# Patient Record
Sex: FEMALE | Race: WHITE | NOT HISPANIC OR LATINO | Employment: FULL TIME | URBAN - METROPOLITAN AREA
[De-identification: names, ages, dates, MRNs, and addresses within clinical notes are randomized per-mention and may not be internally consistent; named-entity substitution may affect disease eponyms.]

---

## 2022-05-25 ENCOUNTER — TELEPHONE (OUTPATIENT)
Dept: TRANSPLANT | Facility: CLINIC | Age: 67
End: 2022-05-25
Payer: COMMERCIAL

## 2022-05-25 NOTE — TELEPHONE ENCOUNTER
General    Reason for call: Christi called to speak Kayla, she will be available after 1:30    Call back needed? Yes  Return Call Needed  Same as documented in contacts section  When to return call?: Same day: Route [facility]

## 2022-05-25 NOTE — TELEPHONE ENCOUNTER
Writer called patient back to ask can she be available for an intake call from either 10-12 or 12-2. No answer

## 2022-06-19 ENCOUNTER — HEALTH MAINTENANCE LETTER (OUTPATIENT)
Age: 67
End: 2022-06-19

## 2022-06-29 NOTE — PROGRESS NOTES
Reason for Visit  Christi Bush is a 67 year old female who is being seen for Transplant Evaluation (Lung tx eval)  Pulmonary HPI  The patient was seen and examined by Adal Veloz MD     Christi Bush is a 67 year old lady with severe lung disease due to ILD (from chronic HP) and bronchiectasis who is being seen today tob e considered for lung transplant candidacy. She is chronically on prednisone at 10mg/day. She is followed by Dr. Mario who referred her to our center. Her comorbidities include impaired glucose tolerance, recurrent sinus infections and GERD.    She has had multiple pulmonary nodules and bronchiectasis since 6/2012. She presented initially on 10/2011 with pneumonia and treated with Zpak and again 3/2012. The cough started in 2011 (late) and also had post nasal drip. She typically had sinus sx about 3x/year, has had sinus issues since late 80's and managed with pseudophed. Her sinus issues were seasonal. She was started on Alb Neb and Flutter valve in 2012 and HTS nebs in 2015.  She had a Open lung biopsy in 12/2018 which was consistent with Chronic HP.    She was started pred burst in mid 2018 and has remained on it since then. She was also started on Advair too. She was on prednisone at 20mg/day on 1/2019.  She gained 20pounds with prednisone in 2019. She was started on MMF in 4/2019. She did not tolerate CellCept or Imuran.    She had about 4 - 6 exacerbation (sinusitis leading to bronchitis) every year, typically treatd with anbx and at time additional steroid bursts. Interestingly in the last year it has decreased and had only two exacerbations. She used flutter valve and nebs. She is currently doing VEST therapy BID - Respitech (70%) along with alb/HTS nebs for the last four months.    She was diagnosed with COVID infection last month. Since then her sinuses have felt more congested but not noticing PND. In addition she has noticed some increased cough c/w previous baseline. The  cough is productive of clear to grey to yellow phlegm in AM (<1tsp/day). No hemoptysis. No CP.  NO Wheezing.     She has chronic cough and has shortness of breath. The SOB is noted with vaccuuming, walking and talking. Without talking has to stop after walking 1.5blocks.  She has not done house work. Her SOB has worsened about 6 - 9 months but is now stable. Her daughter who is present at this visit notes that pt's O2 sats drop to high 80's at times.       Currently she is not having any fevers chills or chest pains.       Occupation/Exposure history: Former smoker. 2 dogs for 11 and 6 years; 2 cats for 15 and 5 years respectively. She denies of exposure to any asbestos, fumes, paints, farm, hot tub, birds.    Oxygen use: None    Current Outpatient Medications   Medication     acetaminophen (TYLENOL) 500 MG tablet     albuterol (PROVENTIL) (2.5 MG/3ML) 0.083% neb solution     aspirin (ASA) 81 MG chewable tablet     cyclobenzaprine (FLEXERIL) 10 MG tablet     cycloSPORINE (RESTASIS) 0.05 % ophthalmic emulsion     DM-Doxylamine-Acetaminophen (NYQUIL HBP COLD & FLU) 15-6. MG/15ML LIQD     fluticasone (FLONASE) 50 MCG/ACT nasal spray     ipratropium (ATROVENT) 0.03 % nasal spray     multivitamin RENAL (RENAVITE RX/NEPHROVITE) 1 MG tablet     nortriptyline (PAMELOR) 10 MG capsule     predniSONE (DELTASONE) 10 MG tablet     sodium chloride inhalant 7 % NEBU neb solution     valACYclovir (VALTREX) 500 MG tablet     celecoxib (CELEBREX) 200 MG capsule     VENTOLIN  (90 Base) MCG/ACT inhaler     No current facility-administered medications for this visit.     No Known Allergies  Past Medical History:   Diagnosis Date     Acute recurrent sinusitis, unspecified location      BPPV (benign paroxysmal positional vertigo)      Bronchiectasis (H) 06/2012     Gastroesophageal reflux disease without esophagitis      Heterozygous factor V Leiden mutation (H)      Hyperlipidemia LDL goal <130      ILD (interstitial lung  "disease) (H)     Chronic HP biopsy (OLBx) in 2018     Impaired fasting glucose      Keratoconjunctivitis sicca (H)      Multiple pulmonary nodules 2012     Vitamin D deficiency        Past Surgical History:   Procedure Laterality Date     BIOPSY Right 2018    Thoracoscopy with biopsy of RUM and RLL with biopsy showing hypersensitivity pneumonitis.     BRONCHOSCOPY  2012     BRONCHOSCOPY  2013     CHOLECYSTECTOMY  2009    Lap cholecystectomy     COLONOSCOPY       ENT SURGERY  2017    Nasal- Septoplasty, Turbinate resection     ORTHOPEDIC SURGERY Left     Hip Arthroplasty       Social History     Socioeconomic History     Marital status:      Spouse name: Not on file     Number of children: Not on file     Years of education: Not on file     Highest education level: Not on file   Occupational History     Not on file   Tobacco Use     Smoking status: Former Smoker     Packs/day: 1.00     Years: 15.00     Pack years: 15.00     Quit date: 1993     Years since quittin.0     Smokeless tobacco: Never Used   Substance and Sexual Activity     Alcohol use: Not Currently     Drug use: Never     Sexual activity: Not on file   Other Topics Concern     Not on file   Social History Narrative    She is   (spouse - \"Charlie\"). Has two children. Works as a R.N. Nurse Manager     Employer: [employer]     Comment: Rheumatology, Pediatric Endocrinology and Bone Density.      Social Determinants of Health     Financial Resource Strain: Not on file   Food Insecurity: Not on file   Transportation Needs: Not on file   Physical Activity: Not on file   Stress: Not on file   Social Connections: Not on file   Intimate Partner Violence: Not on file   Housing Stability: Not on file       Family History   Problem Relation Age of Onset     Cervical Cancer Mother      Hypertension Mother      Diabetes Father      Early Death Father      Heart Disease Father      Cataracts Sister      " "Hypertension Sister      Neurologic Disorder Sister         Primary progressive aphasia     Leukemia Brother      Cataracts Maternal Grandmother      Diabetes Maternal Grandfather      Cataracts Paternal Grandmother      Crohn's Disease Daughter      Rheumatoid Arthritis Daughter      Diabetes Type 1 Daughter      Clotting Disorder Daughter         Factor V leiden mutation     Seizure Disorder Daughter      ROS Pulmonary  Constitutional- Weight gain #30 pounds over the last few years.  Eyes- Dry eyes on REstasis  Ear, nose and throat- Nasal congestion worse since COVID infection.  Cardiac- Negative  Pulm- See HPI  GI- Occ Gas pain - felt most likely due to reflux. Has 'hiatal hernia' on Chest CT. She has had \"GERD\" all her life and this has improved with PPI.   -- Has loose stools, due to magnesium. She is taking this for Migraines.  Genitourinary- Negative  Musculoskeletal- Joint pains - especially knees managed with celebrex.  Neurology- Migraines - well controlled on Magnesium and Nortryptiline.  Dermatology- Negative  Endocrine- Negative  Lymphatic- Negative  Psychiatry- Negative  A complete ROS was otherwise negative except as noted in the HPI.  BP (!) 142/84   Pulse 90   Wt 78.5 kg (173 lb)   SpO2 94%   BMI 28.79 kg/m     Exam:   GENERAL APPEARANCE: Well developed, well nourished, alert, and in no apparent distress.  EYES: PERRL, EOMI  HENT: Nasal mucosa with no edema and no hyperemia. No nasal polyps.  EARS: Canals clear, TMs normal  MOUTH: Oral mucosa is moist, without any lesions, no tonsillar enlargement, no oropharyngeal exudate.  NECK: supple, no masses, no thyromegaly.  LYMPHATICS: No significant axillary, cervical, or supraclavicular nodes.  RESP: normal percussion, good air flow throughout. Lukasz coarse crackles with squeaks and wheezes.  CV: Normal S1, S2, regular rhythm, normal rate. No murmur.  No rub. No gallop. No LE edema.   ABDOMEN:  Bowel sounds normal, soft, nontender, no HSM or masses. "   MS: extremities normal. No clubbing. No cyanosis.  SKIN: no rash on limited exam  NEURO: Mentation intact, speech normal, normal strength and tone, normal gait and stance  PSYCH: mentation appears normal. and affect normal/bright.    Results:  No results found for this or any previous visit (from the past 168 hour(s)).    Urine culture (7/2021): E coli pansensitive.    Induced sputum AFB cultures (1/29/2019, 1/30/2019 and 1/31/2019): Negative    BAL cultures (6/5/2018): M. Gordonae.    Sputum cultures (11/2019): Stenotrophomonas maltophilia and Acinetobacter ursingii.    Lung tissue culture (12/2018): M Serge    6MWT (7/5/2022): Walked 1200ft (366m) on RA, lowest O2 sat was 87%.    Chest CT (5/9/2022):  Chronic pulmonary fibrosis in a non-UIP pattern, which appears slightly progressed from prior.  An area of nodular consolidation along the major fissure is decreased in size from prior. No new or suspicious nodularity. Evidence of prior granulomatous disease.    VATS lung Biopsy (12/2018):Fibrotic interstitial pneumonia with bronchocentric distribution, most suggestive of chronic hypersensitivity pneumonia with advanced fibrosis.    Sinus CT (11/2019): Interval postsurgical changes of functional endoscopic sinus surgery. The bilateral maxillary antrostomies are widely patent. Small-volume frothy secretions layer dependently within the left maxillary sinus, and minimal frothy secretions are seen within the bilateral posterior ethmoid air cells. Correlate with clinical symptoms of acute or chronic sinusitis.     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 She is chronically on prednisone at 10mg/day. She is followed by Dr. Mario who referred her to our center. Her comorbidities include impaired glucose tolerance and GERD.    #. Severe lung disease: Due to  HP and bronchiectasis.  w/u included Bronch/BAL x2 (6/29/12, 6/5/2018), OLBx (12/2018)  Igg, CF testing, CRP/ANCA  Previous cultures: Resistant H influenzae (6/2012)  Current regimen: Pred 10mg/day, HTS nebs BID, Alb neb BID.  - Was on Flutter valve till 2/2022 for two years and then switched to VEST therapy.  Rehab: Not done yet.  Recurrent exac: Better. Less often onw  C19 vaccination: Uptodate. continue yearly Flu vaccination. Needs Pneumovax now.    Will check oxygen titration study today. Will also do overnight oxygen study too.    #. Vasomotor Rhinitis + Allergic:  On Flonase/Atrovent nasal spray.   #. Recurrent sinus infections: Seen by ENT. She is doing salt water rinses. She also has had sinus surgeries too.    #. Multiple pulm nodules: Last Chest CT did not show any new pulm nodules on 5/2022.    #. Hyperlipidemia: Not on meds.    #. Heterozygous factor V Leiden mutation: She has significant bruising and purpura on her arms from the daily aspirin and prednisone. No hematochezia, melena, or hematuria.    #. Herpes labialis: The patient has had no cold sores on daily suppressive Valtrex 500 mg daily.     #. Muscle cramp:  Muscle cramps have been improved--she uses Flexeril rarely before bed.       #. Keratoconjunctivitis sicca: Symptoms are well-controlled with Restasis.    #. Primary osteoarthritis involving multiple joints: She had a left total hip arthroplasty in June 2021 for avascular necrosis and has been doing very well. She has other diffuse joint pain that continues to flare up occasionally.  She takes Celebrex BID regularly.    #. Numbness of toes: She has bilateral foot numbness between her 2nd and 4th toes since wearing sandals in the summer that has persisted despite change to more supportive footwear. She denies pain, weakness, or back pain.     #. GERD: Well controlled for the most part with PPI.  - Advised to sleep with HOB elevated.    #. Cancer screening:  Pap smear (12/2021):  Unsatisfactory for evaluation.  Colonscopy (4/4/22): The examined portion of the ileum was normal. Two 2 to 4 mm polyps at the hepatic flexure and in the ascending colon, removed with a  cold snare. Resected and retrieved. Non-bleeding external hemorrhoids. The examination was otherwise normal. Pathology consistent with tubular adenoma.  Recommendation: Repeat colonoscopy in 5 years for surveillance based on pathology results.  Mammogram (11/2021): ACR BI-RADS Category 1: Negative. Follow Up Imaging in 12 months - Bilateral    #. Lung transplant consideration:  A. I spent quite some time discussing both the lung transplantation evaluation listing process including complications that can be expected post lung transplantation.     B. One of the main points I did reinforce is that the survival post lung transplantation at this time is around 50 to 55% at 5 years. The main reason for this is infection and/or rejection. While most bacterial infections are treatable, the viral infections can cause significant morbidity and mortality. Acute rejection is usually treatable with high dose steroids but chronic rejection (CATHRYN) as you already know does not have good therapy as of date. The other main point is that there is minimal to no survival advantage with lung transplantation.    C. The lung transplant evaluation will involve multiple tests and meeting with cardiothoracic surgeon, Transplant , Nutritionist etc., from our transplant team. Post testing, we will discuss the details at our transplant meeting and will be listed if she meets the criteria for lung transplantation.     D. Once on the list, Christi Bush can expect to stay on the list anywhere from few months to few years, depending on the LAS score. Also the other factors that might play a role is number of organs required and whether she is positive for panel reactive antibodies. She has not recieved transfusions to date. She will need to stay  "within a 50 mile radius of our center for the first three months after the lung transplantation (after hospital discharge).    E. Post lung transplantation, she will require multiple bronchoscopies to evaluate for infections and/or rejections. The major complications post transplantation experienced by most of our patients include: 1. Diabetes, about 30 to 40% of our patients remain on insulin for the rest of their life. 2. Chronic kidney disease, with majority losing about 50% of the kidney function and upto 5 ot 10% requiring hemodialysis and/or renal transplantation. 3. Hypertension, usually well controlled with medications. 4. Malignancy: Especially of skin cancer, and/or lymphoma - usually treatable. The above is not an exhaustive list of all the complications. The others include also airway complications occurring in about 5% of the patients requiring bronchoscopy with bronchial dilation, sometimes stent placement. There is increased risk for DVT and PE particularly in the first six months after transplantation.     F. Discussed with Christi Bush that lung transplantation is an option. The other option is to continue as is and continue cares with us or with her local pulmonologist. We will glad to have palliative care team involved in your care to help manage your symptoms.    G. Discussed about increased risk donors (For HIV/Hep C predominantly).     Issues to be considered:  - Will need to come off Celebrex.  - Encouraged to exercise and keep her BMI <30.  - Will order portable oxygen concentrator and check overnight oximetry.    Mrs. Bush was seen in the clinic today along with Daughter ( \"Madison\"). We discussed at length pros and cons about lung transplantation.  They were given adequate time to ask and answer all the questions to their satisfaction. At this time she has not undergone a lung transplant evaluation. She is doing a  little too well to be benefit from lung transplantation at this time. We " will follow her along with your every six months to monitor disease progression.    Thank you for allowing us to participate in the care of this wonderful patient. Please feel free to contact me if you have any questions at [phone].      I spent 90 minutes on the date of the encounter doing chart review, history and exam, documentation, face to face meeting, counselling and further activities as noted above.  This excludes the time spent doing PFT interpretation.       ADDENDUM: I certify that this patient, Christi Bush is under my care (or a nurse practitioner or physican's assistant working with me). This is the face-to-face encounter for oxygen medical necessity.      Christi Bush is now in a chronic stable state and continues to require supplemental oxygen. Patient has continued oxygen desaturation due to Pulmonary Fibrosis J84.10.    Alternative treatment(s) tried or considered and deemed clinically infective for treatment of Pulmonary Fibrosis J84.10 include nebulizers and steroids.  If portability is ordered, is the patient mobile within the home? yes    **Patients who qualify for home O2 coverage under the CMS guidelines require ABG tests or O2 sat readings obtained closest to, but no earlier than 2 days prior to the discharge, as evidence of the need for home oxygen therapy. Testing must be performed while patient is in the chronic stable state. See notes for O2 sats.**

## 2022-07-05 ENCOUNTER — OFFICE VISIT (OUTPATIENT)
Dept: TRANSPLANT | Facility: CLINIC | Age: 67
End: 2022-07-05
Attending: INTERNAL MEDICINE
Payer: COMMERCIAL

## 2022-07-05 VITALS
SYSTOLIC BLOOD PRESSURE: 142 MMHG | OXYGEN SATURATION: 94 % | WEIGHT: 173 LBS | BODY MASS INDEX: 28.79 KG/M2 | DIASTOLIC BLOOD PRESSURE: 84 MMHG | HEART RATE: 90 BPM

## 2022-07-05 DIAGNOSIS — J67.9 HYPERSENSITIVITY PNEUMONITIS (H): ICD-10-CM

## 2022-07-05 DIAGNOSIS — J47.9 BRONCHIECTASIS WITHOUT COMPLICATION (H): ICD-10-CM

## 2022-07-05 DIAGNOSIS — J47.9 BRONCHIECTASIS (H): ICD-10-CM

## 2022-07-05 LAB
6 MIN WALK (FT): 1200 FT
6 MIN WALK (M): 366 M

## 2022-07-05 PROCEDURE — 99205 OFFICE O/P NEW HI 60 MIN: CPT | Mod: 25 | Performed by: INTERNAL MEDICINE

## 2022-07-05 PROCEDURE — 99417 PROLNG OP E/M EACH 15 MIN: CPT | Performed by: INTERNAL MEDICINE

## 2022-07-05 PROCEDURE — 94618 PULMONARY STRESS TESTING: CPT | Performed by: INTERNAL MEDICINE

## 2022-07-05 RX ORDER — CYCLOBENZAPRINE HCL 10 MG
5-10 TABLET ORAL
COMMUNITY
Start: 2021-12-28

## 2022-07-05 RX ORDER — NORTRIPTYLINE HCL 10 MG
CAPSULE ORAL
COMMUNITY
Start: 2021-07-30

## 2022-07-05 RX ORDER — ALBUTEROL SULFATE 0.83 MG/ML
2.5 SOLUTION RESPIRATORY (INHALATION)
COMMUNITY
Start: 2021-09-21

## 2022-07-05 RX ORDER — CYCLOSPORINE 0.5 MG/ML
1 EMULSION OPHTHALMIC
COMMUNITY
Start: 2021-12-15

## 2022-07-05 RX ORDER — PREDNISONE 10 MG/1
10 TABLET ORAL DAILY
COMMUNITY
Start: 2022-02-04

## 2022-07-05 RX ORDER — CELECOXIB 200 MG/1
200 CAPSULE ORAL 2 TIMES DAILY
COMMUNITY
Start: 2022-05-21

## 2022-07-05 RX ORDER — VALACYCLOVIR HYDROCHLORIDE 500 MG/1
TABLET, FILM COATED ORAL
COMMUNITY
Start: 2021-12-28

## 2022-07-05 RX ORDER — VIT B COMP NO.3/FOLIC/C/BIOTIN 1 MG-60 MG
1 TABLET ORAL DAILY
COMMUNITY
Start: 2022-07-05

## 2022-07-05 RX ORDER — SODIUM CHLORIDE FOR INHALATION 7 %
4 VIAL, NEBULIZER (ML) INHALATION
COMMUNITY
Start: 2022-02-04

## 2022-07-05 RX ORDER — IPRATROPIUM BROMIDE 21 UG/1
2 SPRAY, METERED NASAL
COMMUNITY
Start: 2021-09-21

## 2022-07-05 RX ORDER — ASPIRIN 81 MG/1
81 TABLET, CHEWABLE ORAL DAILY
COMMUNITY
Start: 2022-07-05

## 2022-07-05 RX ORDER — ALBUTEROL SULFATE 90 UG/1
AEROSOL, METERED RESPIRATORY (INHALATION)
COMMUNITY
Start: 2021-11-10

## 2022-07-05 RX ORDER — FLUTICASONE PROPIONATE 50 MCG
2 SPRAY, SUSPENSION (ML) NASAL
COMMUNITY
Start: 2021-12-28

## 2022-07-05 RX ORDER — ACETAMINOPHEN 500 MG
1000 TABLET ORAL
COMMUNITY
Start: 2021-06-14

## 2022-07-05 ASSESSMENT — PAIN SCALES - GENERAL: PAINLEVEL: NO PAIN (0)

## 2022-07-05 NOTE — NURSING NOTE
LUNG TRANSPLANT NEW PATIENT VISIT   Transplant Nurse Coordinator Note  Christi Bush  1955    173 lbs 0 oz  Body mass index is 28.79 kg/m .    Patient accompanied by: Patient came to NPT appointment with daughter Yifan.     Current activity level: walking 1-1.5 miles, working up to doing this daily (recently retired).     ADLs: independent, some housework tasks are more difficult     Pulmonary Rehab: has not attended  Karnofsky Score: 80%    PFT: 5/12/22: FVC 60%, FEV1 69%, FEV1/FVC 92, DLCO 58% ([facility], awaiting full report scan)  6MW: none  Labs: in Care Everywhere and   CT Scan: 5/9/22, in PACS  Echo: none     Current oxygen use: none  Assisted ventilation: none    Diabetic status: not diabetic  Prednisone: 10mg daily, bursts PRN  GERD: yes, omeprazole daily  Smoking: approx. 20 pack-years, quit 1993  Drug use: none  ETOH: no use currently, hx CD treatment 1980  Mental Health concerns: no concerns; experienced mild situational depression, this has resolved     Primary Care:   Established with a PCP provider: yes  Mammogram: 11/18/21, [facility]: BI-RADS 1 Negative  PAP: 12/28/21, Negative  Colonoscopy: 4/4/22, [facility]: (next due 4/2027)  Dental: checkups every 6 months, has some work needing to be done for bridge  Vaccinations:  Pneumococcal: PPSV23 5/26/16  Prevnar 13: 4/13/15  Hep A/B: no Hep A; Hep B 5/17/17, 6/21/17, 2/21/18 and 7/15/94, 9/7/94, 2/22/95  Shingrix: 12/28/21  Tdap: 12/28/21  COVID: Pfizer 1/5/21, 1/26/21, 8/26/21, 3/17/22    Patient status/transplant tab updated.     Misc: factor V leiden (has not had clots)     MD Recommendations: still too well at this time for transplant workup; continue to follow in transplant clinic q 6 months. 6MW (today if possible) and overnight oximetry to document for O2 rx.    Plan: RTC 6 months with Dr. Veloz

## 2022-07-05 NOTE — NURSING NOTE
Chief Complaint   Patient presents with     Transplant Evaluation     Lung tx eval     Blood pressure (!) 142/84, pulse 90, weight 78.5 kg (173 lb), SpO2 94 %.    Pam Ferrell, CMA

## 2022-07-05 NOTE — PATIENT INSTRUCTIONS
Transplant Clinic Discharge Instructions    Oxygen: We will order a six-minute walk (today if possible) to determine exertional oxygen needs, and overnight oximetry study to determine overnight oxygen needs.    Pulmonary Rehab: Please remember to stay active. Continue exercises learned in pulmonary rehab or continue participating in pulmonary rehab, if able. We encourage you to try and be active on days that you are not in pulmonary rehab as well. Walking is a great form of exercise. We encourage you to continue light weights as well to maintain muscle mass.    Weight Management: You are currently 173 lb and we encourage you maintain your weight or work on modest, gradual weight loss as able.  Body mass index is 28.79 kg/m .  Goal BMI greater than 18 (108.2 lb), less than 30 (180.3 lb) for lung transplant.     Medication changes: none today    Follow Up/Next appointment: Return to clinic in 6 months with Dr. Veloz.    Please remember to stay up to date with your primary care requirements including:                Annual check-ups with primary care physician   Mammogram   Pap smear (as appropriate for women)    Dental visits   Annual flu shots/ immunizations as needed   Colonoscopy (patients over 50).     Thank-you for allowing us to participate in your care.    Please contact your transplant coordinator, Kayla at [phone] with any questions, concerns or updates (change in medications, illness, hospital admission, change in oxygen needs, change in insurance coverage, change of phone number or address, etc).    Thoracic Transplant Office phone [phone], fax [phone]    Office Hours 8:30 - 5:00 pm

## 2022-07-05 NOTE — LETTER
7/5/2022         RE: Christi Bush  [address]  [place] [address]        Dear Colleague,    Thank you for referring your patient, Christi Bush, to the [facility]. Please see a copy of my visit note below.    Reason for Visit  Christi Bush is a 67 year old female who is being seen for Transplant Evaluation (Lung tx eval)  Pulmonary HPI  The patient was seen and examined by Adal Veloz MD     Christi Bush is a 67 year old lady with severe lung disease due to ILD (from chronic HP) and bronchiectasis who is being seen today tob e considered for lung transplant candidacy. She is chronically on prednisone at 10mg/day. She is followed by Dr. Mario who referred her to our center. Her comorbidities include impaired glucose tolerance, recurrent sinus infections and GERD.    She has had multiple pulmonary nodules and bronchiectasis since 6/2012. She presented initially on 10/2011 with pneumonia and treated with Zpak and again 3/2012. The cough started in 2011 (late) and also had post nasal drip. She typically had sinus sx about 3x/year, has had sinus issues since late 80's and managed with pseudophed. Her sinus issues were seasonal. She was started on Alb Neb and Flutter valve in 2012 and HTS nebs in 2015.  She had a Open lung biopsy in 12/2018 which was consistent with Chronic HP.    She was started pred burst in mid 2018 and has remained on it since then. She was also started on Advair too. She was on prednisone at 20mg/day on 1/2019.  She gained 20pounds with prednisone in 2019. She was started on MMF in 4/2019. She did not tolerate CellCept or Imuran.    She had about 4 - 6 exacerbation (sinusitis leading to bronchitis) every year, typically treatd with anbx and at time additional steroid bursts. Interestingly in the last year it has decreased and had only two exacerbations. She used flutter valve and nebs. She is currently doing VEST therapy BID - Respitech (70%)  along with alb/HTS nebs for the last four months.    She was diagnosed with COVID infection last month. Since then her sinuses have felt more congested but not noticing PND. In addition she has noticed some increased cough c/w previous baseline. The cough is productive of clear to grey to yellow phlegm in AM (<1tsp/day). No hemoptysis. No CP.  NO Wheezing.     She has chronic cough and has shortness of breath. The SOB is noted with vaccuuming, walking and talking. Without talking has to stop after walking 1.5blocks.  She has not done house work. Her SOB has worsened about 6 - 9 months but is now stable. Her daughter who is present at this visit notes that pt's O2 sats drop to high 80's at times.       Currently she is not having any fevers chills or chest pains.       Occupation/Exposure history: Former smoker. 2 dogs for 11 and 6 years; 2 cats for 15 and 5 years respectively. She denies of exposure to any asbestos, fumes, paints, farm, hot tub, birds.    Oxygen use: None    Current Outpatient Medications   Medication     acetaminophen (TYLENOL) 500 MG tablet     albuterol (PROVENTIL) (2.5 MG/3ML) 0.083% neb solution     aspirin (ASA) 81 MG chewable tablet     cyclobenzaprine (FLEXERIL) 10 MG tablet     cycloSPORINE (RESTASIS) 0.05 % ophthalmic emulsion     DM-Doxylamine-Acetaminophen (NYQUIL HBP COLD & FLU) 15-6. MG/15ML LIQD     fluticasone (FLONASE) 50 MCG/ACT nasal spray     ipratropium (ATROVENT) 0.03 % nasal spray     multivitamin RENAL (RENAVITE RX/NEPHROVITE) 1 MG tablet     nortriptyline (PAMELOR) 10 MG capsule     predniSONE (DELTASONE) 10 MG tablet     sodium chloride inhalant 7 % NEBU neb solution     valACYclovir (VALTREX) 500 MG tablet     celecoxib (CELEBREX) 200 MG capsule     VENTOLIN  (90 Base) MCG/ACT inhaler     No current facility-administered medications for this visit.     No Known Allergies  Past Medical History:   Diagnosis Date     Acute recurrent sinusitis, unspecified  "location      BPPV (benign paroxysmal positional vertigo)      Bronchiectasis (H) 2012     Gastroesophageal reflux disease without esophagitis      Heterozygous factor V Leiden mutation (H)      Hyperlipidemia LDL goal <130      ILD (interstitial lung disease) (H)     Chronic HP biopsy (OLBx) in 2018     Impaired fasting glucose      Keratoconjunctivitis sicca (H)      Multiple pulmonary nodules 2012     Vitamin D deficiency        Past Surgical History:   Procedure Laterality Date     BIOPSY Right 2018    Thoracoscopy with biopsy of RUM and RLL with biopsy showing hypersensitivity pneumonitis.     BRONCHOSCOPY  2012     BRONCHOSCOPY  2013     CHOLECYSTECTOMY  2009    Lap cholecystectomy     COLONOSCOPY       ENT SURGERY  2017    Nasal- Septoplasty, Turbinate resection     ORTHOPEDIC SURGERY Left     Hip Arthroplasty       Social History     Socioeconomic History     Marital status:      Spouse name: Not on file     Number of children: Not on file     Years of education: Not on file     Highest education level: Not on file   Occupational History     Not on file   Tobacco Use     Smoking status: Former Smoker     Packs/day: 1.00     Years: 15.00     Pack years: 15.00     Quit date: 1993     Years since quittin.0     Smokeless tobacco: Never Used   Substance and Sexual Activity     Alcohol use: Not Currently     Drug use: Never     Sexual activity: Not on file   Other Topics Concern     Not on file   Social History Narrative    She is   (spouse - \"Charlie\"). Has two children. Works as a R.N. Nurse Manager     Employer: [employer]     Comment: Rheumatology, Pediatric Endocrinology and Bone Density.      Social Determinants of Health     Financial Resource Strain: Not on file   Food Insecurity: Not on file   Transportation Needs: Not on file   Physical Activity: Not on file   Stress: Not on file   Social Connections: Not on file   Intimate Partner Violence: " "Not on file   Housing Stability: Not on file       Family History   Problem Relation Age of Onset     Cervical Cancer Mother      Hypertension Mother      Diabetes Father      Early Death Father      Heart Disease Father      Cataracts Sister      Hypertension Sister      Neurologic Disorder Sister         Primary progressive aphasia     Leukemia Brother      Cataracts Maternal Grandmother      Diabetes Maternal Grandfather      Cataracts Paternal Grandmother      Crohn's Disease Daughter      Rheumatoid Arthritis Daughter      Diabetes Type 1 Daughter      Clotting Disorder Daughter         Factor V leiden mutation     Seizure Disorder Daughter      ROS Pulmonary  Constitutional- Weight gain #30 pounds over the last few years.  Eyes- Dry eyes on REstasis  Ear, nose and throat- Nasal congestion worse since COVID infection.  Cardiac- Negative  Pulm- See HPI  GI- Occ Gas pain - felt most likely due to reflux. Has 'hiatal hernia' on Chest CT. She has had \"GERD\" all her life and this has improved with PPI.   -- Has loose stools, due to magnesium. She is taking this for Migraines.  Genitourinary- Negative  Musculoskeletal- Joint pains - especially knees managed with celebrex.  Neurology- Migraines - well controlled on Magnesium and Nortryptiline.  Dermatology- Negative  Endocrine- Negative  Lymphatic- Negative  Psychiatry- Negative  A complete ROS was otherwise negative except as noted in the HPI.  BP (!) 142/84   Pulse 90   Wt 78.5 kg (173 lb)   SpO2 94%   BMI 28.79 kg/m     Exam:   GENERAL APPEARANCE: Well developed, well nourished, alert, and in no apparent distress.  EYES: PERRL, EOMI  HENT: Nasal mucosa with no edema and no hyperemia. No nasal polyps.  EARS: Canals clear, TMs normal  MOUTH: Oral mucosa is moist, without any lesions, no tonsillar enlargement, no oropharyngeal exudate.  NECK: supple, no masses, no thyromegaly.  LYMPHATICS: No significant axillary, cervical, or supraclavicular nodes.  RESP: normal " percussion, good air flow throughout. Lukasz coarse crackles with squeaks and wheezes.  CV: Normal S1, S2, regular rhythm, normal rate. No murmur.  No rub. No gallop. No LE edema.   ABDOMEN:  Bowel sounds normal, soft, nontender, no HSM or masses.   MS: extremities normal. No clubbing. No cyanosis.  SKIN: no rash on limited exam  NEURO: Mentation intact, speech normal, normal strength and tone, normal gait and stance  PSYCH: mentation appears normal. and affect normal/bright.    Results:  No results found for this or any previous visit (from the past 168 hour(s)).    Urine culture (7/2021): E coli pansensitive.    Induced sputum AFB cultures (1/29/2019, 1/30/2019 and 1/31/2019): Negative    BAL cultures (6/5/2018): M. Gordonae.    Sputum cultures (11/2019): Stenotrophomonas maltophilia and Acinetobacter ursingii.    Lung tissue culture (12/2018): GERALD Valentine    6MWT (7/5/2022): Walked 1200ft (366m) on RA, lowest O2 sat was 87%.    Chest CT (5/9/2022):  Chronic pulmonary fibrosis in a non-UIP pattern, which appears slightly progressed from prior.  An area of nodular consolidation along the major fissure is decreased in size from prior. No new or suspicious nodularity. Evidence of prior granulomatous disease.    VATS lung Biopsy (12/2018):Fibrotic interstitial pneumonia with bronchocentric distribution, most suggestive of chronic hypersensitivity pneumonia with advanced fibrosis.    Sinus CT (11/2019): Interval postsurgical changes of functional endoscopic sinus surgery. The bilateral maxillary antrostomies are widely patent. Small-volume frothy secretions layer dependently within the left maxillary sinus, and minimal frothy secretions are seen within the bilateral posterior ethmoid air cells. Correlate with clinical symptoms of acute or chronic sinusitis.     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 She is chronically on prednisone at 10mg/day. She is followed by Dr. Mario who referred her to our center. Her comorbidities include impaired glucose tolerance and GERD.    #. Severe lung disease: Due to HP and bronchiectasis.  w/u included Bronch/BAL x2 (6/29/12, 6/5/2018), OLBx (12/2018)  Igg, CF testing, CRP/ANCA  Previous cultures: Resistant H influenzae (6/2012)  Current regimen: Pred 10mg/day, HTS nebs BID, Alb neb BID.  - Was on Flutter valve till 2/2022 for two years and then switched to VEST therapy.  Rehab: Not done yet.  Recurrent exac: Better. Less often onw  C19 vaccination: Uptodate. continue yearly Flu vaccination. Needs Pneumovax now.    Will check oxygen titration study today. Will also do overnight oxygen study too.    #. Vasomotor Rhinitis + Allergic:  On Flonase/Atrovent nasal spray.   #. Recurrent sinus infections: Seen by ENT. She is doing salt water rinses. She also has had sinus surgeries too.    #. Multiple pulm nodules: Last Chest CT did not show any new pulm nodules on 5/2022.    #. Hyperlipidemia: Not on meds.    #. Heterozygous factor V Leiden mutation: She has significant bruising and purpura on her arms from the daily aspirin and prednisone. No hematochezia, melena, or hematuria.    #. Herpes labialis: The patient has had no cold sores on daily suppressive Valtrex 500 mg daily.     #. Muscle cramp:  Muscle cramps have been improved--she uses Flexeril rarely before bed.       #. Keratoconjunctivitis sicca: Symptoms are well-controlled with Restasis.    #. Primary osteoarthritis involving multiple joints: She had a left total hip arthroplasty in June 2021 for avascular necrosis and has been doing very well. She has other diffuse joint pain that continues to flare up occasionally.  She takes Celebrex BID regularly.    #. Numbness of toes: She has bilateral foot numbness between her 2nd and 4th toes since wearing sandals in the summer that has  persisted despite change to more supportive footwear. She denies pain, weakness, or back pain.     #. GERD: Well controlled for the most part with PPI.  - Advised to sleep with HOB elevated.    #. Cancer screening:  Pap smear (12/2021): Unsatisfactory for evaluation.  Colonscopy (4/4/22): The examined portion of the ileum was normal. Two 2 to 4 mm polyps at the hepatic flexure and in the ascending colon, removed with a  cold snare. Resected and retrieved. Non-bleeding external hemorrhoids. The examination was otherwise normal. Pathology consistent with tubular adenoma.  Recommendation: Repeat colonoscopy in 5 years for surveillance based on pathology results.  Mammogram (11/2021): ACR BI-RADS Category 1: Negative. Follow Up Imaging in 12 months - Bilateral    #. Lung transplant consideration:  A. I spent quite some time discussing both the lung transplantation evaluation listing process including complications that can be expected post lung transplantation.     B. One of the main points I did reinforce is that the survival post lung transplantation at this time is around 50 to 55% at 5 years. The main reason for this is infection and/or rejection. While most bacterial infections are treatable, the viral infections can cause significant morbidity and mortality. Acute rejection is usually treatable with high dose steroids but chronic rejection (CATHRYN) as you already know does not have good therapy as of date. The other main point is that there is minimal to no survival advantage with lung transplantation.    C. The lung transplant evaluation will involve multiple tests and meeting with cardiothoracic surgeon, Transplant , Nutritionist etc., from our transplant team. Post testing, we will discuss the details at our transplant meeting and will be listed if she meets the criteria for lung transplantation.     D. Once on the list, Christi Bush can expect to stay on the list anywhere from few months to few  "years, depending on the LAS score. Also the other factors that might play a role is number of organs required and whether she is positive for panel reactive antibodies. She has not recieved transfusions to date. She will need to stay within a 50 mile radius of our center for the first three months after the lung transplantation (after hospital discharge).    E. Post lung transplantation, she will require multiple bronchoscopies to evaluate for infections and/or rejections. The major complications post transplantation experienced by most of our patients include: 1. Diabetes, about 30 to 40% of our patients remain on insulin for the rest of their life. 2. Chronic kidney disease, with majority losing about 50% of the kidney function and upto 5 ot 10% requiring hemodialysis and/or renal transplantation. 3. Hypertension, usually well controlled with medications. 4. Malignancy: Especially of skin cancer, and/or lymphoma - usually treatable. The above is not an exhaustive list of all the complications. The others include also airway complications occurring in about 5% of the patients requiring bronchoscopy with bronchial dilation, sometimes stent placement. There is increased risk for DVT and PE particularly in the first six months after transplantation.     F. Discussed with Christi Bush that lung transplantation is an option. The other option is to continue as is and continue cares with us or with her local pulmonologist. We will glad to have palliative care team involved in your care to help manage your symptoms.    G. Discussed about increased risk donors (For HIV/Hep C predominantly).     Issues to be considered:  - Will need to come off Celebrex.  - Encouraged to exercise and keep her BMI <30.  - Will order portable oxygen concentrator and check overnight oximetry.    Mrs. Bush was seen in the clinic today along with Daughter ( \"Madison\"). We discussed at length pros and cons about lung transplantation.  They " were given adequate time to ask and answer all the questions to their satisfaction. At this time she has not undergone a lung transplant evaluation. She is doing a  little too well to be benefit from lung transplantation at this time. We will follow her along with your every six months to monitor disease progression.    Thank you for allowing us to participate in the care of this wonderful patient. Please feel free to contact me if you have any questions at [phone].      I spent 90 minutes on the date of the encounter doing chart review, history and exam, documentation, face to face meeting, counselling and further activities as noted above.  This excludes the time spent doing PFT interpretation.       ADDENDUM: I certify that this patient, Christi Bush is under my care (or a nurse practitioner or physican's assistant working with me). This is the face-to-face encounter for oxygen medical necessity.      Christi Bush is now in a chronic stable state and continues to require supplemental oxygen. Patient has continued oxygen desaturation due to Pulmonary Fibrosis J84.10.    Alternative treatment(s) tried or considered and deemed clinically infective for treatment of Pulmonary Fibrosis J84.10 include nebulizers and steroids.  If portability is ordered, is the patient mobile within the home? yes    **Patients who qualify for home O2 coverage under the CMS guidelines require ABG tests or O2 sat readings obtained closest to, but no earlier than 2 days prior to the discharge, as evidence of the need for home oxygen therapy. Testing must be performed while patient is in the chronic stable state. See notes for O2 sats.**          Again, thank you for allowing me to participate in the care of your patient.        Sincerely,        Adal Veloz MD

## 2022-07-17 ENCOUNTER — TRANSFERRED RECORDS (OUTPATIENT)
Dept: HEALTH INFORMATION MANAGEMENT | Facility: CLINIC | Age: 67
End: 2022-07-17

## 2022-07-20 DIAGNOSIS — J67.9 HYPERSENSITIVITY PNEUMONITIS (H): Primary | ICD-10-CM

## 2022-07-20 DIAGNOSIS — G47.34 NOCTURNAL OXYGEN DESATURATION: ICD-10-CM

## 2022-07-20 DIAGNOSIS — J47.9 BRONCHIECTASIS (H): ICD-10-CM

## 2022-07-20 NOTE — PROGRESS NOTES
Rehana at [facility] confirmed receipt of updated O2 prescription and overnight oximetry order. [facility] will contact patient directly to arrange for overnight oximetry.

## 2022-07-25 ENCOUNTER — TRANSFERRED RECORDS (OUTPATIENT)
Dept: HEALTH INFORMATION MANAGEMENT | Facility: CLINIC | Age: 67
End: 2022-07-25

## 2022-11-29 DIAGNOSIS — J67.9 HYPERSENSITIVITY PNEUMONITIS (H): ICD-10-CM

## 2022-11-29 DIAGNOSIS — J47.9 BRONCHIECTASIS (H): Primary | ICD-10-CM

## 2022-12-05 ENCOUNTER — TELEPHONE (OUTPATIENT)
Dept: PULMONOLOGY | Facility: CLINIC | Age: 67
End: 2022-12-05

## 2023-01-17 ENCOUNTER — TELEPHONE (OUTPATIENT)
Dept: TRANSPLANT | Facility: CLINIC | Age: 68
End: 2023-01-17
Payer: COMMERCIAL

## 2023-01-17 NOTE — TELEPHONE ENCOUNTER
Called Christi to check on any concerns or new health changes prior to upcoming transplant clinic. She denies any changes or concerns except for recent sinus infection. She reports she was prescribed an antibiotic for this on Friday and has been improving but remains a bit congested. Will review with Dr. Veloz and advise patient if PFTs should be deferred due to congestion. She had no other questions at this time; encouraged her to call with any concerns ahead of clinic visit.

## 2023-01-18 NOTE — PROGRESS NOTES
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 She is chronically on prednisone 10mg/day. She is followed by Dr. Mario who referred her to our center. Her comorbidities include impaired glucose tolerance, recurrent sinus infections and GERD.    She has had multiple pulmonary nodules and bronchiectasis since 6/2012. She presented initially on 10/2011 with pneumonia and treated with Zpak and again 3/2012. The cough started in 2011 (late) and also had post nasal drip. She typically had sinus sx about 3x/year, has had sinus issues since late 80's and managed with pseudophed. Her sinus issues were seasonal. She was started on Alb Neb and Flutter valve in 2012 and HTS nebs in 2015.  She had a Open lung biopsy in 12/2018 which was consistent with Chronic HP.    She was started pred burst in mid 2018 and has remained on it since then. She was also started on Advair too. She was on prednisone at 20mg/day on 1/2019.  She gained 20pounds with prednisone in 2019. She was started on MMF in 4/2019. She did not tolerate CellCept or Imuran.    She had about 4 - 6 exacerbation (sinusitis leading to bronchitis) every year, typically treated with anbx and at time additional steroid bursts. Interestingly in the last year it has decreased and had only two exacerbations. She used flutter valve and nebs. She is currently doing VEST therapy BID - Respitech (70%) along with alb/HTS nebs for the last four months.    She was diagnosed with COVID infection on 5/2022. Since then her sinuses have felt more congested but not noticing PND. She needed antibiotics in October 2021 and then Augmentin in January 2022 and Levaquin  August 2022.     Since the last visit she was treated with levaquin on 9/2022 for 10 days. She was started on Myfortic too in part due to progressive sx.    Interval history:  Overall her pulm sx are stable over the last six months. She is currently finishing a course of levaquin for sinusitis. The cough is productive of clear to grey to yellow phlegm in AM (<1tsp/day). No hemoptysis. No CP.  NO Wheezing.     She has chronic cough and has shortness of breath. The SOB is noted with vaccuuming, walking and talking. She is now walking 1mile twice daily on Treadmill at 2mph.  She does note SOB upon bending forward along with dizziness. No leg swelling.       Currently she is not having any fevers chills or chest pains.     Occupation/Exposure history: Former smoker. 2 dogs for 11 and 6 years; 2 cats for 15 and 5 years respectively. She denies of exposure to any asbestos, fumes, paints, farm, hot tub, birds.    Oxygen use: 2lpm NC at night (7/2022).    Current Outpatient Medications   Medication     acetaminophen (TYLENOL) 500 MG tablet     albuterol (PROVENTIL) (2.5 MG/3ML) 0.083% neb solution     aspirin (ASA) 81 MG chewable tablet     celecoxib (CELEBREX) 200 MG capsule     cyclobenzaprine (FLEXERIL) 10 MG tablet     cycloSPORINE (RESTASIS) 0.05 % ophthalmic emulsion     DM-Doxylamine-Acetaminophen (NYQUIL HBP COLD & FLU) 15-6. MG/15ML LIQD     fluticasone (FLONASE) 50 MCG/ACT nasal spray     ipratropium (ATROVENT) 0.03 % nasal spray     levofloxacin (LEVAQUIN) 500 MG tablet     multivitamin RENAL (RENAVITE RX/NEPHROVITE) 1 MG tablet     mycophenolic acid (GENERIC EQUIVALENT) 360 MG EC tablet     nortriptyline (PAMELOR) 10 MG capsule     predniSONE (DELTASONE) 10 MG tablet     sodium chloride inhalant 7 % NEBU neb solution     valACYclovir (VALTREX) 500 MG tablet     VENTOLIN  (90 Base) MCG/ACT inhaler     No current facility-administered medications for this visit.     No Known Allergies  Past Medical  "History:   Diagnosis Date     Acute recurrent sinusitis, unspecified location      BPPV (benign paroxysmal positional vertigo)      Bronchiectasis (H) 2012     Gastroesophageal reflux disease without esophagitis      Heterozygous factor V Leiden mutation (H)      Hyperlipidemia LDL goal <130      ILD (interstitial lung disease) (H)     Chronic HP biopsy (OLBx) in 2018     Impaired fasting glucose      Keratoconjunctivitis sicca (H)      Multiple pulmonary nodules 2012     Vitamin D deficiency        Past Surgical History:   Procedure Laterality Date     BIOPSY Right 2018    Thoracoscopy with biopsy of RUM and RLL with biopsy showing hypersensitivity pneumonitis.     BRONCHOSCOPY  2012     BRONCHOSCOPY  2013     CHOLECYSTECTOMY  2009    Lap cholecystectomy     COLONOSCOPY       ENT SURGERY  2017    Nasal- Septoplasty, Turbinate resection     ORTHOPEDIC SURGERY Left     Hip Arthroplasty       Social History     Socioeconomic History     Marital status:      Spouse name: Not on file     Number of children: Not on file     Years of education: Not on file     Highest education level: Not on file   Occupational History     Not on file   Tobacco Use     Smoking status: Former     Packs/day: 1.25     Years: 20.00     Pack years: 25.00     Types: Cigarettes     Start date: 1972     Quit date: 1993     Years since quittin.5     Smokeless tobacco: Never   Substance and Sexual Activity     Alcohol use: Not Currently     Drug use: Never     Sexual activity: Not on file   Other Topics Concern     Not on file   Social History Narrative    She is   (spouse - \"Charlie\"). Has two children. Works as a R.N. Nurse Manager     Employer: [employer]     Comment: Rheumatology, Pediatric Endocrinology and Bone Density.      Social Determinants of Health     Financial Resource Strain: Not on file   Food Insecurity: Not on file   Transportation Needs: Not on file   Physical " "Activity: Not on file   Stress: Not on file   Social Connections: Not on file   Intimate Partner Violence: Not on file   Housing Stability: Not on file       Family History   Problem Relation Age of Onset     Cervical Cancer Mother      Hypertension Mother      Diabetes Father      Early Death Father      Heart Disease Father      Cataracts Sister      Hypertension Sister      Neurologic Disorder Sister         Primary progressive aphasia     Leukemia Brother      Cataracts Maternal Grandmother      Diabetes Maternal Grandfather      Cataracts Paternal Grandmother      Crohn's Disease Daughter      Rheumatoid Arthritis Daughter      Diabetes Type 1 Daughter      Clotting Disorder Daughter         Factor V leiden mutation     Seizure Disorder Daughter      ROS Pulmonary  Constitutional- Weight is stable.  Eyes- Dry eyes on REstasis  Ear, nose and throat- Has PND while on Flonase/atrovent and sinus irrigation. last year had two sinus infection.  Cardiac- Negative  Pulm- See HPI  GI- Occ Gas pain - felt most likely due to reflux. Has 'hiatal hernia' on Chest CT. She has had \"GERD\" all her life and this has improved with PPI.   -- Has loose stools, due to magnesium. She is taking this for Migraines.  Genitourinary- Negative  Musculoskeletal- Joint pains - especially knees managed with celebrex.  Neurology- Migraines - well controlled on Magnesium and Nortryptiline.  Dermatology- Negative  Endocrine- Negative  Lymphatic- Negative  Psychiatry- Negative  A complete ROS was otherwise negative except as noted in the HPI.  /66 (BP Location: Right arm, Patient Position: Sitting, Cuff Size: Adult Regular)   Pulse 105   Temp 98.3  F (36.8  C) (Oral)   Resp 18   Ht 1.648 m (5' 4.88\")   Wt 79.3 kg (174 lb 14.4 oz)   SpO2 95%   BMI 29.21 kg/m     Exam:   GENERAL APPEARANCE: Well developed, well nourished, alert, and in no apparent distress.  EYES: PERRL, EOMI  HENT: Nasal mucosa with no edema and no hyperemia. No nasal " polyps.  EARS: Canals clear, TMs normal  MOUTH: Oral mucosa is moist, without any lesions, no tonsillar enlargement, no oropharyngeal exudate.  NECK: supple, no masses, no thyromegaly.  LYMPHATICS: No significant axillary, cervical, or supraclavicular nodes.  RESP: normal percussion, good air flow throughout. Lukasz coarse crackles with squeaks and wheezes.  CV: Normal S1, S2, regular rhythm, normal rate. No murmur.  No rub. No gallop. No LE edema.   ABDOMEN:  Bowel sounds normal, soft, nontender, no HSM or masses.   MS: extremities normal. No clubbing. No cyanosis.  SKIN: no rash on limited exam  NEURO: Mentation intact, speech normal, normal strength and tone, normal gait and stance  PSYCH: mentation appears normal. and affect normal/bright.    Results:  No results found for this or any previous visit (from the past 168 hour(s)).     PFT:  9/16/22: FVC 58 %, FEV1 67 %, FEV1/FVC 92, DCLO 54 %,     2/2022: FVC 57%, FEV1 66%, FEV1/FVC 93, DLCO 56%    5/2022: FVC 60%, FEV1 69%, FEV1/FVC 92, DLCO 58%    Urine culture (7/2021): E coli pansensitive.    Induced sputum AFB cultures (1/29/2019, 1/30/2019 and 1/31/2019): Negative    BAL cultures (6/5/2018): M. Gordonae.    Sputum cultures (11/2019): Stenotrophomonas maltophilia and Acinetobacter ursingii.    Lung tissue culture (12/2018): GERALD Valentine    6MWT (7/5/2022): Walked 1200ft (366m) on RA, lowest O2 sat was 87%.    Chest CT (5/9/2022):  Chronic pulmonary fibrosis in a non-UIP pattern, which appears slightly progressed from prior.  An area of nodular consolidation along the major fissure is decreased in size from prior. No new or suspicious nodularity. Evidence of prior granulomatous disease.    VATS lung Biopsy (12/2018):Fibrotic interstitial pneumonia with bronchocentric distribution, most suggestive of chronic hypersensitivity pneumonia with advanced fibrosis.    Sinus CT (11/2019): Interval postsurgical changes of functional endoscopic sinus surgery. The bilateral  maxillary antrostomies are widely patent. Small-volume frothy secretions layer dependently within the left maxillary sinus, and minimal frothy secretions are seen within the bilateral posterior ethmoid air cells. Correlate with clinical symptoms of acute or chronic sinusitis.    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 She is chronically on prednisone at 10mg/day. She is followed by Dr. Mario who referred her to our center. Her comorbidities include impaired glucose tolerance and GERD.    #. Severe lung disease: Due to HP and bronchiectasis.  w/u included Bronch/BAL x2 (6/29/12, 6/5/2018), OLBx (12/2018)  Igg, CF testing, CRP/ANCA neg.  Previous cultures: Resistant H influenzae (6/2012)  Current regimen: Pred 10mg/day, HTS nebs BID, Alb neb BID. VEST therapy BID alternating with Flutter valve. Myfortic 720mg BID.  Rehab: Not done yet.  Recurrent exac: Better. Less often now.  Vaccination: C19 vaccination Uptodate. continue yearly Flu vaccination. Due for Pneumovax, will give today.  - Has not completed Shingrix vaccination series.    #. Vasomotor Rhinitis + Allergic:  On Flonase/Atrovent nasal spray.   #. Recurrent sinus infections: Seen by ENT. She is doing salt water rinses. She also has had sinus surgeries too.  1/19/2023: Currently on levaquin course.    #. Multiple pulm nodules: Last Chest CT on 5/2022 did not show any new pulm nodules.    #. Hyperlipidemia: Not on meds.    #. Heterozygous factor V Leiden mutation: She has significant bruising and purpura on her arms from the daily aspirin and prednisone. No hematochezia, melena, or hematuria.    #. Herpes labialis: She has had no cold sores on daily suppressive Valtrex 500 mg daily.     #. Muscle cramp:  Muscle cramps have been improved--she uses Flexeril rarely before bed.       #.  "Keratoconjunctivitis sicca: Symptoms are well-controlled with Restasis.    #. Primary osteoarthritis involving multiple joints: She had a left total hip arthroplasty in June 2021 for avascular necrosis and has been doing very well. She has other diffuse joint pain that continues to flare up occasionally.  She takes Celebrex BID regularly.    #. Numbness of toes: She has bilateral foot numbness between her 2nd and 4th toes since wearing sandals in the summer that has persisted despite change to more supportive footwear. She denies pain, weakness, or back pain.     #. GERD: Well controlled for the most part with PPI.  - Advised to sleep with HOB elevated.    #. Cancer screening:  Pap smear (12/2021): Unsatisfactory for evaluation.  Colonscopy (4/4/22): The examined portion of the ileum was normal. Two 2 to 4 mm polyps at the hepatic flexure and in the ascending colon, removed with a  cold snare. Resected and retrieved. Non-bleeding external hemorrhoids. The examination was otherwise normal. Pathology consistent with tubular adenoma.  Recommendation: Repeat colonoscopy in 5 years for surveillance based on pathology results.  Mammogram (11/2021): ACR BI-RADS Category 1: Negative. Repeat  pending.    #. Lung transplant consideration:  We discussed pro cons of lung tx on 7/2022. At that time the risk benefit ratio did not favor lung transplantation.    Issues to be considered:  - Will need to come off Celebrex.  - Encouraged to exercise and keep her BMI <30.  - Nausea with Mycophenolate and AZA, but tolerating MYfortic well.    Mrs. Bush was seen in the clinic today along with Daughter ( \"Madison\"). At this time she has not undergone a lung transplant evaluation. We will follow her along with your every six months to monitor disease progression.    Thank you for allowing us to participate in the care of this wonderful patient. Please feel free to contact me if you have any questions at [phone].  "

## 2023-01-19 ENCOUNTER — OFFICE VISIT (OUTPATIENT)
Dept: TRANSPLANT | Facility: CLINIC | Age: 68
End: 2023-01-19
Attending: INTERNAL MEDICINE
Payer: COMMERCIAL

## 2023-01-19 VITALS
TEMPERATURE: 98.3 F | OXYGEN SATURATION: 95 % | RESPIRATION RATE: 18 BRPM | HEIGHT: 65 IN | SYSTOLIC BLOOD PRESSURE: 133 MMHG | DIASTOLIC BLOOD PRESSURE: 66 MMHG | HEART RATE: 105 BPM | WEIGHT: 174.9 LBS | BODY MASS INDEX: 29.14 KG/M2

## 2023-01-19 DIAGNOSIS — Z23 ENCOUNTER FOR IMMUNIZATION: ICD-10-CM

## 2023-01-19 DIAGNOSIS — J47.9 BRONCHIECTASIS WITHOUT COMPLICATION (H): Primary | ICD-10-CM

## 2023-01-19 PROCEDURE — 90732 PPSV23 VACC 2 YRS+ SUBQ/IM: CPT | Performed by: INTERNAL MEDICINE

## 2023-01-19 PROCEDURE — G0463 HOSPITAL OUTPT CLINIC VISIT: HCPCS | Mod: 25 | Performed by: INTERNAL MEDICINE

## 2023-01-19 PROCEDURE — 99214 OFFICE O/P EST MOD 30 MIN: CPT | Performed by: INTERNAL MEDICINE

## 2023-01-19 PROCEDURE — 250N000011 HC RX IP 250 OP 636: Performed by: INTERNAL MEDICINE

## 2023-01-19 PROCEDURE — G0009 ADMIN PNEUMOCOCCAL VACCINE: HCPCS | Performed by: INTERNAL MEDICINE

## 2023-01-19 RX ORDER — MYCOPHENOLIC ACID 360 MG/1
2 TABLET, DELAYED RELEASE ORAL 2 TIMES DAILY
COMMUNITY
Start: 2022-12-31

## 2023-01-19 RX ORDER — LEVOFLOXACIN 500 MG/1
1 TABLET, FILM COATED ORAL
COMMUNITY
Start: 2023-01-13 | End: 2023-01-24

## 2023-01-19 RX ADMIN — PNEUMOCOCCAL VACCINE POLYVALENT 0.5 ML
25; 25; 25; 25; 25; 25; 25; 25; 25; 25; 25; 25; 25; 25; 25; 25; 25; 25; 25; 25; 25; 25; 25 INJECTION, SOLUTION INTRAMUSCULAR; SUBCUTANEOUS at 14:29

## 2023-01-19 ASSESSMENT — PAIN SCALES - GENERAL: PAINLEVEL: NO PAIN (0)

## 2023-01-19 NOTE — NURSING NOTE
"Chief Complaint   Patient presents with     RECHECK     Pre lung transplant      Vital signs:  Temp: 98.3  F (36.8  C) Temp src: Oral BP: 133/66 Pulse: 105   Resp: 18 SpO2: 95 % (RA)     Height: 164.8 cm (5' 4.88\") (shoe off) Weight: 79.3 kg (174 lb 14.4 oz) (shoe off)  Estimated body mass index is 29.21 kg/m  as calculated from the following:    Height as of this encounter: 1.648 m (5' 4.88\").    Weight as of this encounter: 79.3 kg (174 lb 14.4 oz).        Jerrica Correa, [facility]  1/19/2023 1:40 PM      "

## 2023-01-19 NOTE — PATIENT INSTRUCTIONS
Transplant Clinic Discharge Instructions    Pulmonary Rehab: Please remember to stay active.  Continue exercises learned in pulmonary rehab or continue participating in pulmonary rehab, if able. We encourage you to try and be active on days that you are not in pulmonary rehab as well. Walking is a great form of exercise. We encourage you to continue light weights as well to maintain muscle mass.    Weight Management: You are currently 174 lb and we encourage you to maintain your weight and stay active.  Body mass index is 29.21 kg/m .  Goal BMI greater than 18, less than 30 for lung transplant.     Medication changes: none today    Follow Up/Next appointment: return to clinic in 1 year to see Dr. Veloz, with pulmonary function testing and 6 min walk.    Please remember to stay up to date with your primary care requirements including:                Annual check-ups with primary care physician   Mammogram   Pap smear (as appropriate for women)    PSA (for men over 50)   Dental visits   Annual flu shots/ immunizations as needed   Colonoscopy (patients over 50).     Thank-you for allowing us to participate in your care.    Please contact your transplant coordinator, Kayla ANDERSEN at [phone] with any questions, concerns or updates (change in medications, illness, hospital admission, change in oxygen needs, change in insurance coverage, change of phone number or address, etc).    Thoracic Transplant Office phone [phone], fax [phone]    Office Hours 8:30 - 5:00 pm

## 2023-01-19 NOTE — NURSING NOTE
"Patient accompanied by: self  Current activity level: walking 2mi daily, stretching, light weights  Pulmonary Rehab: has not attended  Recommendations: continue excellent activity regimen  Patient status assessment updated. Karnofsky: 80%    Current oxygen use:  Rest 0  Activity awaiting pulsed concentrator  Sleep 2L   Assisted Ventilation: none  Diabetic status: not diabetic  5' 4.882\"[shoe off[ 174 lbs 14.4 oz Body mass index is 29.21 kg/m .    Medication changes: none today  Plan: RTC 1 year    "

## 2023-01-19 NOTE — LETTER
1/19/2023         RE: Christi Bush  [address]  [place] [address]        Dear Colleague,    Thank you for referring your patient, Christi Bush, to the [facility]. Please see a copy of my visit note below.    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 She is chronically on prednisone 10mg/day. She is followed by Dr. Mario who referred her to our center. Her comorbidities include impaired glucose tolerance, recurrent sinus infections and GERD.    She has had multiple pulmonary nodules and bronchiectasis since 6/2012. She presented initially on 10/2011 with pneumonia and treated with Zpak and again 3/2012. The cough started in 2011 (late) and also had post nasal drip. She typically had sinus sx about 3x/year, has had sinus issues since late 80's and managed with pseudophed. Her sinus issues were seasonal. She was started on Alb Neb and Flutter valve in 2012 and HTS nebs in 2015.  She had a Open lung biopsy in 12/2018 which was consistent with Chronic HP.    She was started pred burst in mid 2018 and has remained on it since then. She was also started on Advair too. She was on prednisone at 20mg/day on 1/2019.  She gained 20pounds with prednisone in 2019. She was started on MMF in 4/2019. She did not tolerate CellCept or Imuran.    She had about 4 - 6 exacerbation (sinusitis leading to bronchitis) every year, typically treated with anbx and at time additional steroid bursts. Interestingly in the last year it has decreased and had only two exacerbations. She used flutter valve and nebs. She is currently doing VEST therapy BID - Respitech (70%) along  with alb/HTS nebs for the last four months.    She was diagnosed with COVID infection on 5/2022. Since then her sinuses have felt more congested but not noticing PND. She needed antibiotics in October 2021 and then Augmentin in January 2022 and Levaquin August 2022.     Since the last visit she was treated with levaquin on 9/2022 for 10 days. She was started on Myfortic too in part due to progressive sx.    Interval history:  Overall her pulm sx are stable over the last six months. She is currently finishing a course of levaquin for sinusitis. The cough is productive of clear to grey to yellow phlegm in AM (<1tsp/day). No hemoptysis. No CP.  NO Wheezing.     She has chronic cough and has shortness of breath. The SOB is noted with vaccuuming, walking and talking. She is now walking 1mile twice daily on Treadmill at 2mph.  She does note SOB upon bending forward along with dizziness. No leg swelling.       Currently she is not having any fevers chills or chest pains.     Occupation/Exposure history: Former smoker. 2 dogs for 11 and 6 years; 2 cats for 15 and 5 years respectively. She denies of exposure to any asbestos, fumes, paints, farm, hot tub, birds.    Oxygen use: 2lpm NC at night (7/2022).    Current Outpatient Medications   Medication     acetaminophen (TYLENOL) 500 MG tablet     albuterol (PROVENTIL) (2.5 MG/3ML) 0.083% neb solution     aspirin (ASA) 81 MG chewable tablet     celecoxib (CELEBREX) 200 MG capsule     cyclobenzaprine (FLEXERIL) 10 MG tablet     cycloSPORINE (RESTASIS) 0.05 % ophthalmic emulsion     DM-Doxylamine-Acetaminophen (NYQUIL HBP COLD & FLU) 15-6. MG/15ML LIQD     fluticasone (FLONASE) 50 MCG/ACT nasal spray     ipratropium (ATROVENT) 0.03 % nasal spray     levofloxacin (LEVAQUIN) 500 MG tablet     multivitamin RENAL (RENAVITE RX/NEPHROVITE) 1 MG tablet     mycophenolic acid (GENERIC EQUIVALENT) 360 MG EC tablet     nortriptyline (PAMELOR) 10 MG capsule     predniSONE (DELTASONE) 10  "MG tablet     sodium chloride inhalant 7 % NEBU neb solution     valACYclovir (VALTREX) 500 MG tablet     VENTOLIN  (90 Base) MCG/ACT inhaler     No current facility-administered medications for this visit.     No Known Allergies  Past Medical History:   Diagnosis Date     Acute recurrent sinusitis, unspecified location      BPPV (benign paroxysmal positional vertigo)      Bronchiectasis (H) 2012     Gastroesophageal reflux disease without esophagitis      Heterozygous factor V Leiden mutation (H)      Hyperlipidemia LDL goal <130      ILD (interstitial lung disease) (H)     Chronic HP biopsy (OLBx) in 2018     Impaired fasting glucose      Keratoconjunctivitis sicca (H)      Multiple pulmonary nodules 2012     Vitamin D deficiency        Past Surgical History:   Procedure Laterality Date     BIOPSY Right 2018    Thoracoscopy with biopsy of RUM and RLL with biopsy showing hypersensitivity pneumonitis.     BRONCHOSCOPY  2012     BRONCHOSCOPY  2013     CHOLECYSTECTOMY  2009    Lap cholecystectomy     COLONOSCOPY       ENT SURGERY  2017    Nasal- Septoplasty, Turbinate resection     ORTHOPEDIC SURGERY Left     Hip Arthroplasty       Social History     Socioeconomic History     Marital status:      Spouse name: Not on file     Number of children: Not on file     Years of education: Not on file     Highest education level: Not on file   Occupational History     Not on file   Tobacco Use     Smoking status: Former     Packs/day: 1.25     Years: 20.00     Pack years: 25.00     Types: Cigarettes     Start date: 1972     Quit date: 1993     Years since quittin.5     Smokeless tobacco: Never   Substance and Sexual Activity     Alcohol use: Not Currently     Drug use: Never     Sexual activity: Not on file   Other Topics Concern     Not on file   Social History Narrative    She is   (spouse - \"Charlie\"). Has two children. Works as a R.N. Nurse Manager     Employer: [employer]     Comment: Rheumatology, Pediatric Endocrinology and Bone Density.      Social Determinants of Health     Financial Resource Strain: Not on file   Food Insecurity: Not on file   Transportation Needs: Not on file   Physical Activity: Not on file   Stress: Not on file   Social Connections: Not on file   Intimate Partner Violence: Not on file   Housing Stability: Not on file       Family History   Problem Relation Age of Onset     Cervical Cancer Mother      Hypertension Mother      Diabetes Father      Early Death Father      Heart Disease Father      Cataracts Sister      Hypertension Sister      Neurologic Disorder Sister         Primary progressive aphasia     Leukemia Brother      Cataracts Maternal Grandmother      Diabetes Maternal Grandfather      Cataracts Paternal Grandmother      Crohn's Disease Daughter      Rheumatoid Arthritis Daughter      Diabetes Type 1 Daughter      Clotting Disorder Daughter         Factor V leiden mutation     Seizure Disorder Daughter      ROS Pulmonary  Constitutional- Weight is stable.  Eyes- Dry eyes on REstasis  Ear, nose and throat- Has PND while on Flonase/atrovent and sinus irrigation. last year had two sinus infection.  Cardiac- Negative  Pulm- See HPI  GI- Occ Gas pain - felt most likely due to reflux. Has 'hiatal hernia' on Chest CT. She has had \"GERD\" all her life and this has improved with PPI.   -- Has loose stools, due to magnesium. She is taking this for Migraines.  Genitourinary- Negative  Musculoskeletal- Joint pains - especially knees managed with celebrex.  Neurology- Migraines - well controlled on Magnesium and Nortryptiline.  Dermatology- Negative  Endocrine- Negative  Lymphatic- Negative  Psychiatry- Negative  A complete ROS was otherwise negative except as noted in the HPI.  /66 (BP Location: Right arm, Patient Position: Sitting, Cuff Size: Adult Regular)   Pulse 105   Temp 98.3  F (36.8  C) (Oral)   Resp 18   Ht 1.648 m " "(5' 4.88\")   Wt 79.3 kg (174 lb 14.4 oz)   SpO2 95%   BMI 29.21 kg/m     Exam:   GENERAL APPEARANCE: Well developed, well nourished, alert, and in no apparent distress.  EYES: PERRL, EOMI  HENT: Nasal mucosa with no edema and no hyperemia. No nasal polyps.  EARS: Canals clear, TMs normal  MOUTH: Oral mucosa is moist, without any lesions, no tonsillar enlargement, no oropharyngeal exudate.  NECK: supple, no masses, no thyromegaly.  LYMPHATICS: No significant axillary, cervical, or supraclavicular nodes.  RESP: normal percussion, good air flow throughout. Lukasz coarse crackles with squeaks and wheezes.  CV: Normal S1, S2, regular rhythm, normal rate. No murmur.  No rub. No gallop. No LE edema.   ABDOMEN:  Bowel sounds normal, soft, nontender, no HSM or masses.   MS: extremities normal. No clubbing. No cyanosis.  SKIN: no rash on limited exam  NEURO: Mentation intact, speech normal, normal strength and tone, normal gait and stance  PSYCH: mentation appears normal. and affect normal/bright.    Results:  No results found for this or any previous visit (from the past 168 hour(s)).     PFT:  9/16/22: FVC 58 %, FEV1 67 %, FEV1/FVC 92, DCLO 54 %,     2/2022: FVC 57%, FEV1 66%, FEV1/FVC 93, DLCO 56%    5/2022: FVC 60%, FEV1 69%, FEV1/FVC 92, DLCO 58%    Urine culture (7/2021): E coli pansensitive.    Induced sputum AFB cultures (1/29/2019, 1/30/2019 and 1/31/2019): Negative    BAL cultures (6/5/2018): M. Gordonae.    Sputum cultures (11/2019): Stenotrophomonas maltophilia and Acinetobacter ursingii.    Lung tissue culture (12/2018): GEARLD Valentine    6MWT (7/5/2022): Walked 1200ft (366m) on RA, lowest O2 sat was 87%.    Chest CT (5/9/2022):  Chronic pulmonary fibrosis in a non-UIP pattern, which appears slightly progressed from prior.  An area of nodular consolidation along the major fissure is decreased in size from prior. No new or suspicious nodularity. Evidence of prior granulomatous disease.    VATS lung Biopsy " (12/2018):Fibrotic interstitial pneumonia with bronchocentric distribution, most suggestive of chronic hypersensitivity pneumonia with advanced fibrosis.    Sinus CT (11/2019): Interval postsurgical changes of functional endoscopic sinus surgery. The bilateral maxillary antrostomies are widely patent. Small-volume frothy secretions layer dependently within the left maxillary sinus, and minimal frothy secretions are seen within the bilateral posterior ethmoid air cells. Correlate with clinical symptoms of acute or chronic sinusitis.    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 She is chronically on prednisone at 10mg/day. She is followed by Dr. Mario who referred her to our center. Her comorbidities include impaired glucose tolerance and GERD.    #. Severe lung disease: Due to HP and bronchiectasis.  w/u included Bronch/BAL x2 (6/29/12, 6/5/2018), OLBx (12/2018)  Igg, CF testing, CRP/ANCA neg.  Previous cultures: Resistant H influenzae (6/2012)  Current regimen: Pred 10mg/day, HTS nebs BID, Alb neb BID. VEST therapy BID alternating with Flutter valve. Myfortic 720mg BID.  Rehab: Not done yet.  Recurrent exac: Better. Less often now.  Vaccination: C19 vaccination Uptodate. continue yearly Flu vaccination. Due for Pneumovax, will give today.  - Has not completed Shingrix vaccination series.    #. Vasomotor Rhinitis + Allergic:  On Flonase/Atrovent nasal spray.   #. Recurrent sinus infections: Seen by ENT. She is doing salt water rinses. She also has had sinus surgeries too.  1/19/2023: Currently on levaquin course.    #. Multiple pulm nodules: Last Chest CT on 5/2022 did not show any new pulm nodules.    #. Hyperlipidemia: Not on meds.    #. Heterozygous factor V Leiden mutation: She has significant bruising and purpura on her arms from the daily aspirin and  "prednisone. No hematochezia, melena, or hematuria.    #. Herpes labialis: She has had no cold sores on daily suppressive Valtrex 500 mg daily.     #. Muscle cramp:  Muscle cramps have been improved--she uses Flexeril rarely before bed.       #. Keratoconjunctivitis sicca: Symptoms are well-controlled with Restasis.    #. Primary osteoarthritis involving multiple joints: She had a left total hip arthroplasty in June 2021 for avascular necrosis and has been doing very well. She has other diffuse joint pain that continues to flare up occasionally.  She takes Celebrex BID regularly.    #. Numbness of toes: She has bilateral foot numbness between her 2nd and 4th toes since wearing sandals in the summer that has persisted despite change to more supportive footwear. She denies pain, weakness, or back pain.     #. GERD: Well controlled for the most part with PPI.  - Advised to sleep with HOB elevated.    #. Cancer screening:  Pap smear (12/2021): Unsatisfactory for evaluation.  Colonscopy (4/4/22): The examined portion of the ileum was normal. Two 2 to 4 mm polyps at the hepatic flexure and in the ascending colon, removed with a  cold snare. Resected and retrieved. Non-bleeding external hemorrhoids. The examination was otherwise normal. Pathology consistent with tubular adenoma.  Recommendation: Repeat colonoscopy in 5 years for surveillance based on pathology results.  Mammogram (11/2021): ACR BI-RADS Category 1: Negative. Repeat  pending.    #. Lung transplant consideration:  We discussed pro cons of lung tx on 7/2022. At that time the risk benefit ratio did not favor lung transplantation.    Issues to be considered:  - Will need to come off Celebrex.  - Encouraged to exercise and keep her BMI <30.  - Nausea with Mycophenolate and AZA, but tolerating MYfortic well.    Mrs. Bush was seen in the clinic today along with Daughter ( \"Madison\"). At this time she has not undergone a lung transplant evaluation. We will follow " her along with your every six months to monitor disease progression.    Thank you for allowing us to participate in the care of this wonderful patient. Please feel free to contact me if you have any questions at [phone].      Again, thank you for allowing me to participate in the care of your patient.        Sincerely,        Adal Veloz MD

## 2023-04-09 ENCOUNTER — HEALTH MAINTENANCE LETTER (OUTPATIENT)
Age: 68
End: 2023-04-09

## 2024-01-16 ENCOUNTER — DOCUMENTATION ONLY (OUTPATIENT)
Dept: HOME HEALTH SERVICES | Facility: CLINIC | Age: 69
End: 2024-01-16
Payer: COMMERCIAL

## 2024-01-16 NOTE — PROGRESS NOTES
[facility]     Patient may benefit from flutter valve and/or vest therapy to aid in airway clearance.     Dawna James  Respiratory Therapist  [facility]  [address]  [place] [address]  [email or web address]  Critical access hospital Main Phone Line [phone]  Critical access hospital Fax: [phone]

## 2024-06-16 ENCOUNTER — HEALTH MAINTENANCE LETTER (OUTPATIENT)
Age: 69
End: 2024-06-16

## 2024-08-25 ENCOUNTER — HEALTH MAINTENANCE LETTER (OUTPATIENT)
Age: 69
End: 2024-08-25

## 2025-06-21 ENCOUNTER — HEALTH MAINTENANCE LETTER (OUTPATIENT)
Age: 70
End: 2025-06-21